# Patient Record
(demographics unavailable — no encounter records)

---

## 2025-03-05 NOTE — ASSESSMENT
[FreeTextEntry1] : 73 y.o. Female follows for Hypothyroidism. Transfers care from Dr. Mac  Patient has been on stable dose of Synthroid 100 mcg daily  # Hypothyroidism Lab provided by PCP office 2/2025 TSH 4.7 slightly above upper normal range FT4 index is upper normal 3.8 PCP increased LT4 to 112 mcg daily a week ago Clinically euthyroid.  Will repeat TFTs in 6-7 weeks  If normal follow up in 6 months.

## 2025-03-05 NOTE — PHYSICAL EXAM
[Alert] : alert [Obese] : obese [No Acute Distress] : no acute distress [Well Developed] : well developed [Normal Voice/Communication] : normal voice communication [PERRL] : pupils equal, round and reactive to light [Normal Hearing] : hearing was normal [No Neck Mass] : no neck mass was observed [Thyroid Not Enlarged] : the thyroid was not enlarged [No Thyroid Nodules] : no palpable thyroid nodules [No Respiratory Distress] : no respiratory distress [Clear to Auscultation] : lungs were clear to auscultation bilaterally [Normal Rate] : heart rate was normal [Regular Rhythm] : with a regular rhythm [Normal Supraclavicular Nodes] : no supraclavicular lymphadenopathy [Normal Anterior Cervical Nodes] : no anterior cervical lymphadenopathy [No Clubbing, Cyanosis] : no clubbing  or cyanosis of the fingernails [Normal Reflexes] : deep tendon reflexes were 2+ and symmetric [No Tremors] : no tremors [Oriented x3] : oriented to person, place, and time [Normal Affect] : the affect was normal [Normal Insight/Judgement] : insight and judgment were intact [Normal Mood] : the mood was normal